# Patient Record
Sex: FEMALE | Employment: UNEMPLOYED | URBAN - METROPOLITAN AREA
[De-identification: names, ages, dates, MRNs, and addresses within clinical notes are randomized per-mention and may not be internally consistent; named-entity substitution may affect disease eponyms.]

---

## 2023-10-18 ENCOUNTER — HOSPITAL ENCOUNTER (OUTPATIENT)
Age: 23
Discharge: HOME | End: 2023-10-18
Attending: STUDENT IN AN ORGANIZED HEALTH CARE EDUCATION/TRAINING PROGRAM | Admitting: STUDENT IN AN ORGANIZED HEALTH CARE EDUCATION/TRAINING PROGRAM
Payer: MEDICAID

## 2023-10-18 VITALS
RESPIRATION RATE: 20 BRPM | SYSTOLIC BLOOD PRESSURE: 123 MMHG | HEART RATE: 105 BPM | DIASTOLIC BLOOD PRESSURE: 53 MMHG | TEMPERATURE: 97.9 F

## 2023-10-18 PROBLEM — Z98.891 H/O CESAREAN SECTION: Status: ACTIVE | Noted: 2023-10-18

## 2023-10-18 PROBLEM — Z3A.38 38 WEEKS GESTATION OF PREGNANCY: Status: ACTIVE | Noted: 2023-10-18

## 2023-10-18 PROBLEM — O26.899 ABDOMINAL PAIN COMPLICATING PREGNANCY, ANTEPARTUM: Status: ACTIVE | Noted: 2023-10-18

## 2023-10-18 PROBLEM — R10.9 ABDOMINAL PAIN COMPLICATING PREGNANCY, ANTEPARTUM: Status: ACTIVE | Noted: 2023-10-18

## 2023-10-18 LAB
BACTERIA URNS QL MICRO: ABNORMAL
BILIRUB UR QL STRIP: ABNORMAL
CANDIDA SPECIES: POSITIVE
CASTS #/AREA URNS LPF: ABNORMAL /LPF (ref 0–8)
CLARITY UR: ABNORMAL
COLOR UR: ABNORMAL
EPI CELLS #/AREA URNS HPF: ABNORMAL /HPF (ref 0–5)
GARDNERELLA VAGINALIS: POSITIVE
GLUCOSE UR STRIP-MCNC: NEGATIVE MG/DL
HGB UR QL STRIP.AUTO: NEGATIVE
KETONES UR STRIP-MCNC: NEGATIVE MG/DL
LEUKOCYTE ESTERASE UR QL STRIP: ABNORMAL
NITRITE UR QL STRIP: NEGATIVE
PH UR STRIP: 7.5 [PH] (ref 5–8)
PROT UR STRIP-MCNC: ABNORMAL MG/DL
RBC #/AREA URNS HPF: ABNORMAL /HPF (ref 0–4)
SOURCE: ABNORMAL
SP GR UR STRIP: 1.02 (ref 1–1.03)
TRICHOMONAS: NEGATIVE
UROBILINOGEN UR STRIP-ACNC: NORMAL EU/DL (ref 0–1)
WBC #/AREA URNS HPF: ABNORMAL /HPF (ref 0–5)

## 2023-10-18 PROCEDURE — 99213 OFFICE O/P EST LOW 20 MIN: CPT

## 2023-10-18 PROCEDURE — 81001 URINALYSIS AUTO W/SCOPE: CPT

## 2023-10-18 PROCEDURE — 99213 OFFICE O/P EST LOW 20 MIN: CPT | Performed by: STUDENT IN AN ORGANIZED HEALTH CARE EDUCATION/TRAINING PROGRAM

## 2023-10-18 PROCEDURE — 87660 TRICHOMONAS VAGIN DIR PROBE: CPT

## 2023-10-18 PROCEDURE — 87480 CANDIDA DNA DIR PROBE: CPT

## 2023-10-18 PROCEDURE — 87510 GARDNER VAG DNA DIR PROBE: CPT

## 2023-10-18 PROCEDURE — 87591 N.GONORRHOEAE DNA AMP PROB: CPT

## 2023-10-18 PROCEDURE — 87086 URINE CULTURE/COLONY COUNT: CPT

## 2023-10-18 PROCEDURE — 87491 CHLMYD TRACH DNA AMP PROBE: CPT

## 2023-10-18 RX ORDER — ACETAMINOPHEN 500 MG
1000 TABLET ORAL EVERY 6 HOURS PRN
Status: DISCONTINUED | OUTPATIENT
Start: 2023-10-18 | End: 2023-10-18 | Stop reason: HOSPADM

## 2023-10-18 RX ORDER — ONDANSETRON 2 MG/ML
4 INJECTION INTRAMUSCULAR; INTRAVENOUS EVERY 6 HOURS PRN
Status: DISCONTINUED | OUTPATIENT
Start: 2023-10-18 | End: 2023-10-18 | Stop reason: HOSPADM

## 2023-10-18 RX ORDER — ONDANSETRON 4 MG/1
4 TABLET, ORALLY DISINTEGRATING ORAL EVERY 8 HOURS PRN
Status: DISCONTINUED | OUTPATIENT
Start: 2023-10-18 | End: 2023-10-18 | Stop reason: HOSPADM

## 2023-10-18 NOTE — FLOWSHEET NOTE
Dr. Stewart Henderson at bedside, discusses POC for assessing/ r/o for SROM. Pt apprehensive, Dr. Stewart Henderson explains process/ POC. Nitrazine test, equivical.  Dr. Stewart Henderson discusses POC for further assessment, SVE, poss spec exam.  Pt considering these options at this time.

## 2023-10-18 NOTE — FLOWSHEET NOTE
Pt received to L&D per w/c from ER with c/o decreased fetal movement, vaginal pressure et \"c/s scar pain\". States sees Dr. Bridgett Danielson et saw him in the office prior to arrival.  States that she refused to go to [facility] as recommended. Placed in triage 1. Up to BR et gowned. Pt states that she's been to the hospital \" a lot \" this pregnancy.

## 2023-10-18 NOTE — DISCHARGE INSTRUCTIONS
Notify Physician for:       *  Regular contractions that are  5 minutes apart or less lasting longer than 1 hr for 1st baby, 10 mins apart or less if 2nd baby or greater. *  Leaking or gush of fluid from vagina. *  Any vaginal bleeding that is heavier than a menstrual period. *  Decrease or absence of baby movement. *  Vaginal pressure, low backache. *  Vomiting or diarrhea for several hours, and unable to take in/ keep fluids or food down. *  Increase or change in vaginal discharge. *  Abdominal or menstrual- like cramping that is constant or intermittent. *  Fever and/ or chills. *  Headache              *  Blurred and or visual changes-  (spots before eyes, \"floaters\")              *  Upper abdominal/ epigastric pain  Activities:       Activity as tolerated                      Diet:          Diet as tolerated    Drink 10- 12 glasses of water daily.     Be sure to keep next scheduled appt, and call your Dr. With any questions  Follow up with [facility] provider as scheduled

## 2023-10-18 NOTE — H&P
OBSTETRICAL HISTORY [address]    Date: 10/18/2023       Time: 12:38 PM   Patient Name: Gillian Bangura     Patient : 2000  Room/Bed: [place] [address]    Admission Date/Time: 10/18/2023 10:26 AM      CC: Decreased Fetal Movement     HPI: Gillian Bangura is a 20 y.o. [id number] at Unknown who presents to the labor and delivery unit with complaints of decreased fetal movement. Patient also reports that she has had abdominal pain over her  section scar from her previous pregnancy and intermittent leakage of fluid. The patient reports that she has not had any large gushes, denies fever/chills, or other symptoms. The patient follows with [facility], and has received routine prenatal care until this point. The patient reports fetal movement is decreased, denies contractions, denies vaginal bleeding. Patient denies headache, vision changes, nausea, vomiting, fever, chills, shortness of breath, chest pain, RUQ pain, abdominal pain, diarrhea, change in color/amount/odor of vaginal discharge, dysuria or, hematuria. DATING:  LMP: No LMP recorded (exact date). Patient is pregnant. Estimated Date of Delivery: None noted.    Based on: Early ultrasound, at 7 3/7 weeks GA    PREGNANCY RISK FACTORS:  Patient Active Problem List   Diagnosis    38 weeks gestation of pregnancy        Steroids Given In This Pregnancy:  no     REVIEW OF SYSTEMS:   Constitutional: negative fever, negative chills, negative weight changes   HEENT: negative visual disturbances, negative headaches, negative dizziness, negative hearing loss  Breast: Negative breast abnormalities, negative breast lumps, negative nipple discharge  Respiratory: negative dyspnea, negative cough, negative SOB  Cardiovascular: negative chest pain,  negative palpitations, negative arrhythmia, negative syncope   Gastrointestinal: + abdominal pain, negative RUQ pain, negative N/V, negative diarrhea, negative constipation, negative

## 2023-10-19 LAB
C TRACH DNA SPEC QL PROBE+SIG AMP: NEGATIVE
MICROORGANISM SPEC CULT: NORMAL
N GONORRHOEA DNA SPEC QL PROBE+SIG AMP: NEGATIVE
SPECIMEN DESCRIPTION: NORMAL
SPECIMEN DESCRIPTION: NORMAL